# Patient Record
(demographics unavailable — no encounter records)

---

## 2025-01-13 NOTE — HEALTH RISK ASSESSMENT
[Yes] : Yes [Monthly or less (1 pt)] : Monthly or less (1 point) [1 or 2 (0 pts)] : 1 or 2 (0 points) [Never (0 pts)] : Never (0 points) [No] : In the past 12 months have you used drugs other than those required for medical reasons? 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

## 2025-01-13 NOTE — HISTORY OF PRESENT ILLNESS
[de-identified] : 33 yr old M presents today as a new patient for exam   last [facility] at [facility] did physical with [facility] for work purposes   Fela Leon - october 23rd

## 2025-01-13 NOTE — ASSESSMENT
[FreeTextEntry1] : patient with appropriate preventative UTD  no concerns on exam  age-appropriate counseling given.